# Patient Record
Sex: FEMALE | Race: WHITE | Employment: FULL TIME | URBAN - METROPOLITAN AREA
[De-identification: names, ages, dates, MRNs, and addresses within clinical notes are randomized per-mention and may not be internally consistent; named-entity substitution may affect disease eponyms.]

---

## 2018-08-01 ENCOUNTER — TELEPHONE (OUTPATIENT)
Dept: FAMILY MEDICINE CLINIC | Age: 28
End: 2018-08-01

## 2018-08-02 ENCOUNTER — OFFICE VISIT (OUTPATIENT)
Dept: FAMILY MEDICINE CLINIC | Age: 28
End: 2018-08-02

## 2018-08-02 VITALS
TEMPERATURE: 96.9 F | HEIGHT: 71 IN | WEIGHT: 113 LBS | BODY MASS INDEX: 15.82 KG/M2 | SYSTOLIC BLOOD PRESSURE: 126 MMHG | HEART RATE: 73 BPM | RESPIRATION RATE: 16 BRPM | DIASTOLIC BLOOD PRESSURE: 84 MMHG | OXYGEN SATURATION: 97 %

## 2018-08-02 DIAGNOSIS — Z76.89 ENCOUNTER TO ESTABLISH CARE: ICD-10-CM

## 2018-08-02 DIAGNOSIS — G89.29 CHRONIC BILATERAL LOW BACK PAIN WITHOUT SCIATICA: ICD-10-CM

## 2018-08-02 DIAGNOSIS — M54.50 CHRONIC BILATERAL LOW BACK PAIN WITHOUT SCIATICA: ICD-10-CM

## 2018-08-02 DIAGNOSIS — M25.551 BILATERAL HIP PAIN: Primary | ICD-10-CM

## 2018-08-02 DIAGNOSIS — M25.552 BILATERAL HIP PAIN: Primary | ICD-10-CM

## 2018-08-02 DIAGNOSIS — R39.89 BLADDER PAIN: ICD-10-CM

## 2018-08-02 RX ORDER — IBUPROFEN 800 MG/1
800 TABLET ORAL
Qty: 60 TAB | Refills: 0 | Status: SHIPPED | OUTPATIENT
Start: 2018-08-02

## 2018-08-02 RX ORDER — CYCLOBENZAPRINE HCL 10 MG
5 TABLET ORAL
Qty: 30 TAB | Refills: 0 | Status: SHIPPED | OUTPATIENT
Start: 2018-08-02

## 2018-08-02 NOTE — PATIENT INSTRUCTIONS
Hip Pain: Care Instructions Your Care Instructions Hip pain may be caused by many things, including overuse, a fall, or a twisting movement. Another cause of hip pain is arthritis. Your pain may increase when you stand up, walk, or squat. The pain may come and go or may be constant. Home treatment can help relieve hip pain, swelling, and stiffness. If your pain is ongoing, you may need more tests and treatment. Follow-up care is a key part of your treatment and safety. Be sure to make and go to all appointments, and call your doctor if you are having problems. It's also a good idea to know your test results and keep a list of the medicines you take. How can you care for yourself at home? · Take pain medicines exactly as directed. ¨ If the doctor gave you a prescription medicine for pain, take it as prescribed. ¨ If you are not taking a prescription pain medicine, ask your doctor if you can take an over-the-counter medicine. · Rest and protect your hip. Take a break from any activity, including standing or walking, that may cause pain. · Put ice or a cold pack against your hip for 10 to 20 minutes at a time. Try to do this every 1 to 2 hours for the next 3 days (when you are awake) or until the swelling goes down. Put a thin cloth between the ice and your skin. · Sleep on your healthy side with a pillow between your knees, or sleep on your back with pillows under your knees. · If there is no swelling, you can put moist heat, a heating pad, or a warm cloth on your hip. Do gentle stretching exercises to help keep your hip flexible. · Learn how to prevent falls. Have your vision and hearing checked regularly. Wear slippers or shoes with a nonskid sole. · Stay at a healthy weight. · Wear comfortable shoes. When should you call for help? Call 911 anytime you think you may need emergency care.  For example, call if: 
  · You have sudden chest pain and shortness of breath, or you cough up blood.  
  · You are not able to stand or walk or bear weight.  
  · Your buttocks, legs, or feet feel numb or tingly.  
  · Your leg or foot is cool or pale or changes color.  
  · You have severe pain.  
 Call your doctor now or seek immediate medical care if: 
  · You have signs of infection, such as: 
¨ Increased pain, swelling, warmth, or redness in the hip area. ¨ Red streaks leading from the hip area. ¨ Pus draining from the hip area. ¨ A fever.  
  · You have signs of a blood clot, such as: 
¨ Pain in your calf, back of the knee, thigh, or groin. ¨ Redness and swelling in your leg or groin.  
  · You are not able to bend, straighten, or move your leg normally.  
  · You have trouble urinating or having bowel movements.  
 Watch closely for changes in your health, and be sure to contact your doctor if: 
  · You do not get better as expected. Where can you learn more? Go to http://shelley-arturo.info/. Enter I520 in the search box to learn more about \"Hip Pain: Care Instructions. \" Current as of: November 20, 2017 Content Version: 11.7 © 7708-6036 [employer]. Care instructions adapted under license by [employer] (which disclaims liability or warranty for this information). If you have questions about a medical condition or this instruction, always ask your healthcare professional. [place] [address] any warranty or liability for your use of this information. Low Back Pain: Exercises Your Care Instructions Here are some examples of typical rehabilitation exercises for your condition. Start each exercise slowly. Ease off the exercise if you start to have pain. Your doctor or physical therapist will tell you when you can start these exercises and which ones will work best for you. How to do the exercises Press-up 1. Lie on your stomach, supporting your body with your forearms.  
2. Press your elbows down into the floor to raise your upper back. As you do this, relax your stomach muscles and allow your back to arch without using your back muscles. As your press up, do not let your hips or pelvis come off the floor. 3. Hold for 15 to 30 seconds, then relax. 4. Repeat 2 to 4 times. Alternate arm and leg (bird dog) exercise Do this exercise slowly. Try to keep your body straight at all times, and do not let one hip drop lower than the other. 1. Start on the floor, on your hands and knees. 2. Tighten your belly muscles. 3. Raise one leg off the floor, and hold it straight out behind you. Be careful not to let your hip drop down, because that will twist your trunk. 4. Hold for about 6 seconds, then lower your leg and switch to the other leg. 5. Repeat 8 to 12 times on each leg. 6. Over time, work up to holding for 10 to 30 seconds each time. 7. If you feel stable and secure with your leg raised, try raising the opposite arm straight out in front of you at the same time. Knee-to-chest exercise 1. Lie on your back with your knees bent and your feet flat on the floor. 2. Bring one knee to your chest, keeping the other foot flat on the floor (or keeping the other leg straight, whichever feels better on your lower back). 3. Keep your lower back pressed to the floor. Hold for at least 15 to 30 seconds. 4. Relax, and lower the knee to the starting position. 5. Repeat with the other leg. Repeat 2 to 4 times with each leg. 6. To get more stretch, put your other leg flat on the floor while pulling your knee to your chest. 
Curl-ups 1. Lie on the floor on your back with your knees bent at a 90-degree angle. Your feet should be flat on the floor, about 12 inches from your buttocks. 2. Cross your arms over your chest. If this bothers your neck, try putting your hands behind your neck (not your head), with your elbows spread apart. 3. Slowly tighten your belly muscles and raise your shoulder blades off the floor.  
4. Keep your head in line with your body, and do not press your chin to your chest. 
5. Hold this position for 1 or 2 seconds, then slowly lower yourself back down to the floor. 6. Repeat 8 to 12 times. Pelvic tilt exercise 1. Lie on your back with your knees bent. 2. \"Brace\" your stomach. This means to tighten your muscles by pulling in and imagining your belly button moving toward your spine. You should feel like your back is pressing to the floor and your hips and pelvis are rocking back. 3. Hold for about 6 seconds while you breathe smoothly. 4. Repeat 8 to 12 times. Heel dig bridging 1. Lie on your back with both knees bent and your ankles bent so that only your heels are digging into the floor. Your knees should be bent about 90 degrees. 2. Then push your heels into the floor, squeeze your buttocks, and lift your hips off the floor until your shoulders, hips, and knees are all in a straight line. 3. Hold for about 6 seconds as you continue to breathe normally, and then slowly lower your hips back down to the floor and rest for up to 10 seconds. 4. Do 8 to 12 repetitions. Hamstring stretch in doorway 1. Lie on your back in a doorway, with one leg through the open door. 2. Slide your leg up the wall to straighten your knee. You should feel a gentle stretch down the back of your leg. 3. Hold the stretch for at least 15 to 30 seconds. Do not arch your back, point your toes, or bend either knee. Keep one heel touching the floor and the other heel touching the wall. 4. Repeat with your other leg. 5. Do 2 to 4 times for each leg. Hip flexor stretch 1. Kneel on the floor with one knee bent and one leg behind you. Place your forward knee over your foot. Keep your other knee touching the floor. 2. Slowly push your hips forward until you feel a stretch in the upper thigh of your rear leg. 3. Hold the stretch for at least 15 to 30 seconds. Repeat with your other leg. 4. Do 2 to 4 times on each side.  
Darline Record sit 
 
1. Stand with your back 10 to 12 inches away from a wall. 2. Lean into the wall until your back is flat against it. 3. Slowly slide down until your knees are slightly bent, pressing your lower back into the wall. 4. Hold for about 6 seconds, then slide back up the wall. 5. Repeat 8 to 12 times. Follow-up care is a key part of your treatment and safety. Be sure to make and go to all appointments, and call your doctor if you are having problems. It's also a good idea to know your test results and keep a list of the medicines you take. Where can you learn more? Go to http://shelley-arturo.info/. Enter V958 in the search box to learn more about \"Low Back Pain: Exercises. \" Current as of: November 29, 2017 Content Version: 11.7 © 9059-0912 [employer]. Care instructions adapted under license by [employer] (which disclaims liability or warranty for this information). If you have questions about a medical condition or this instruction, always ask your healthcare professional. [address] any warranty or liability for your use of this information.

## 2018-08-02 NOTE — PROGRESS NOTES
Lilia Current is a 25 y.o. female Chief Complaint Patient presents with [facility] Establish Care  Hip Pain  
  bilateral  
 
 
 
1. Have you been to the ER, urgent care clinic since your last visit? Hospitalized since your last visit? No 
 
2. Have you seen or consulted any other health care providers outside of the [address] [place] [address] since your last visit? Include any pap smears or colon screening.  No

## 2018-08-02 NOTE — PROGRESS NOTES
Judy Barbosa is a 25 y.o.  female and presents with Chief Complaint Patient presents with [facility] Establish Care  Hip Pain  
  bilateral  
 
 
Subjective: 
Ms. Grace Pearson presents today with complaints of bilateral hip pain that has been present for the past 10 years. Pain started in right hip and radiates down into the right leg. She had physical therapy when she was living in [place]. She was told her hips were misaligned. Since she completed physical therapy she had pain in the left hip. She only has pain when she overdoes herself. There is nothing in particular that causes her flare. She will take OTC tylenol-rarely. She has never seen an orthopedic doctor because her symptoms were all muscle related. Now she states she has pain in her bladder. Pain is random. She feels like pain occurs right before her period. She has urine frequency and urgency. She denies burning with urination. She states she had an ultrasound of her bladder and was told she was retaining urine. She was due to see a urologist but was not able to follow up prior to moving from [place]. Additional Concerns: Ms. Grace Pearson is here to establish care. There is no problem list on file for this patient. No Known Allergies History reviewed. No pertinent past medical history. Past Surgical History:  
Procedure Laterality Date  HX GYN Tubal Ligation 2016 Family History Problem Relation Age of Onset  Alcohol abuse Mother  Elevated Lipids Father  Cancer Paternal Grandmother Colon Cancer  Atrial Fibrillation Paternal Grandmother Social History Substance Use Topics  Smoking status: Former Smoker Quit date: 1/1/2015  Smokeless tobacco: Never Used  Alcohol use Yes Comment: occassionally ROS History obtained from the patient General ROS: negative for - chills or fever Respiratory ROS: no cough, shortness of breath, or wheezing Cardiovascular ROS: no chest pain or dyspnea on exertion Gastrointestinal ROS: no abdominal pain, change in bowel habits, or black or bloody stools Genito-Urinary ROS: positive for - urinary frequency/urgency- intermittent Musculoskeletal ROS: positive for - pain in hip - bilateral 
 
All other systems reviewed and are negative. Objective: 
Vitals:  
 08/02/18 1607 08/02/18 1612 BP: 147/87 126/84 Pulse: 73 Resp: 16 Temp: 96.9 °F (36.1 °C) TempSrc: Oral   
SpO2: 97% Weight: 113 lb (51.3 kg) Height: 5' 11\" (1.803 m) PainSc:   6 PainLoc: Back LMP: 08/01/2018 PE General appearance - alert, well appearing, and in no distress Mental status - normal mood, behavior, speech, dress, motor activity, and thought processes Chest - clear to auscultation, no wheezes, rales or rhonchi, symmetric air entry Heart - normal rate and regular rhythm Musculoskeletal - no joint tenderness, deformity or swelling; tenderness along low back Extremities - peripheral pulses normal, no pedal edema, no clubbing or cyanosis Assessment/Plan: 1. Bilateral Hip Pain- does not want additional xrays done today; would like to obtain records from former PCP to review; ibuprofen PRN pain; flexeril for spasms 2. Low Back Pain- Ibuprofen PRN 3. Bladder Pain- UA for further eval 
 
Lab review: orders written for new lab studies as appropriate; see orders Today's Visit: Ibuprofen, Flexeril, UA Health Maintenance:  
 
I have discussed the diagnosis with the patient and the intended plan as seen in the above orders. The patient has received an after-visit summary and questions were answered concerning future plans. I have discussed medication side effects and warnings with the patient as well. I have reviewed the plan of care with the patient, accepted their input and they are in agreement with the treatment goals.   
 
 
Follow-up Disposition: 
Return in about 1 month (around 9/2/2018), or if symptoms worsen or fail to improve, for will follow up after records are obtained from [place]. More than 1/2 of this 30 minute visit was spent in counseling and coordination of care, as described above.  
 
ROB TerrazasC

## 2018-08-02 NOTE — MR AVS SNAPSHOT
[facility] 
 
 
 [id number] [place] [address] [address] [place] [address] [phone] 
[phone] Patient: Marcela Marques MRN: [id number] HTN:[id number] Visit Information Date & Time Provider Department Dept. Phone Encounter #  
 8/2/2018  4:00 PM Hasmukh Traore [phone] Follow-up Instructions Return in about 1 month (around 9/2/2018), or if symptoms worsen or fail to improve, for will follow up after records are obtained from [place]. Upcoming Health Maintenance Date Due DTaP/Tdap/Td series (1 - Tdap) 5/19/2009 PAP AKA CERVICAL CYTOLOGY 5/19/2009 Influenza Age 5 to Adult 8/1/2018 Allergies as of 8/2/2018  Review Complete On: 8/2/2018 By: Dang Alexis NP No Known Allergies Current Immunizations  Never Reviewed No immunizations on file. Not reviewed this visit You Were Diagnosed With   
  
 Codes Comments Bilateral hip pain    -  Primary ICD-10-CM: M25.551, M25.552 ICD-9-CM: 719.45 Chronic bilateral low back pain without sciatica     ICD-10-CM: M54.5, G89.29 ICD-9-CM: 724.2, 338.29 Bladder pain     ICD-10-CM: R39.89 ICD-9-CM: 788.99 Encounter to establish care     ICD-10-CM: Z76.89 
ICD-9-CM: V65.8 Vitals BP Pulse Temp Resp Height(growth percentile) Weight(growth percentile) 126/84 (BP 1 Location: Left arm, BP Patient Position: Sitting) 73 96.9 °F (36.1 °C) (Oral) 16 5' 11\" (1.803 m) 113 lb (51.3 kg) LMP SpO2 BMI OB Status Smoking Status 08/01/2018 (Exact Date) 97% 15.76 kg/m2 Having regular periods Former Smoker Vitals History BMI and BSA Data Body Mass Index Body Surface Area 15.76 kg/m 2 1.6 m 2 Preferred Pharmacy Pharmacy Name Phone [facility] [facility] [phone] Your Updated Medication List  
  
   
 This list is accurate as of 8/2/18  4:32 PM.  Always use your most recent med list.  
  
  
  
  
 cyclobenzaprine 10 mg tablet Commonly known as:  FLEXERIL Take 0.5 Tabs by mouth three (3) times daily as needed for Muscle Spasm(s). ibuprofen 800 mg tablet Commonly known as:  MOTRIN Take 1 Tab by mouth every eight (8) hours as needed for Pain. Prescriptions Sent to Pharmacy Refills  
 ibuprofen (MOTRIN) 800 mg tablet 0 Sig: Take 1 Tab by mouth every eight (8) hours as needed for Pain. Class: Normal  
 Pharmacy: [facility] [address]  #: [phone] Route: Oral  
 cyclobenzaprine (FLEXERIL) 10 mg tablet 0 Sig: Take 0.5 Tabs by mouth three (3) times daily as needed for Muscle Spasm(s). Class: Normal  
 Pharmacy: [facility] [address] [place] [address]. [address] Ph #: [phone] Route: Oral  
  
Follow-up Instructions Return in about 1 month (around 9/2/2018), or if symptoms worsen or fail to improve, for will follow up after records are obtained from [place]. To-Do List   
 08/02/2018 Lab:  URINALYSIS W/ RFLX MICROSCOPIC Patient Instructions Hip Pain: Care Instructions Your Care Instructions Hip pain may be caused by many things, including overuse, a fall, or a twisting movement. Another cause of hip pain is arthritis. Your pain may increase when you stand up, walk, or squat. The pain may come and go or may be constant. Home treatment can help relieve hip pain, swelling, and stiffness. If your pain is ongoing, you may need more tests and treatment. Follow-up care is a key part of your treatment and safety. Be sure to make and go to all appointments, and call your doctor if you are having problems. It's also a good idea to know your test results and keep a list of the medicines you take. How can you care for yourself at home? · Take pain medicines exactly as directed. ¨ If the doctor gave you a prescription medicine for pain, take it as prescribed. ¨ If you are not taking a prescription pain medicine, ask your doctor if you can take an over-the-counter medicine. · Rest and protect your hip. Take a break from any activity, including standing or walking, that may cause pain. · Put ice or a cold pack against your hip for 10 to 20 minutes at a time. Try to do this every 1 to 2 hours for the next 3 days (when you are awake) or until the swelling goes down. Put a thin cloth between the ice and your skin. · Sleep on your healthy side with a pillow between your knees, or sleep on your back with pillows under your knees. · If there is no swelling, you can put moist heat, a heating pad, or a warm cloth on your hip. Do gentle stretching exercises to help keep your hip flexible. · Learn how to prevent falls. Have your vision and hearing checked regularly. Wear slippers or shoes with a nonskid sole. · Stay at a healthy weight. · Wear comfortable shoes. When should you call for help? Call 911 anytime you think you may need emergency care. For example, call if: 
  · You have sudden chest pain and shortness of breath, or you cough up blood.  
  · You are not able to stand or walk or bear weight.  
  · Your buttocks, legs, or feet feel numb or tingly.  
  · Your leg or foot is cool or pale or changes color.  
  · You have severe pain.  
 Call your doctor now or seek immediate medical care if: 
  · You have signs of infection, such as: 
¨ Increased pain, swelling, warmth, or redness in the hip area. ¨ Red streaks leading from the hip area. ¨ Pus draining from the hip area. ¨ A fever.  
  · You have signs of a blood clot, such as: 
¨ Pain in your calf, back of the knee, thigh, or groin. ¨ Redness and swelling in your leg or groin.  
  · You are not able to bend, straighten, or move your leg normally.   · You have trouble urinating or having bowel movements.  
 Watch closely for changes in your health, and be sure to contact your doctor if: 
  · You do not get better as expected. Where can you learn more? Go to http://shelley-arturo.info/. Enter C208 in the search box to learn more about \"Hip Pain: Care Instructions. \" Current as of: November 20, 2017 Content Version: 11.7 © 4087-0851 [employer] Care instructions adapted under license by [employer] (which disclaims liability or warranty for this information). If you have questions about a medical condition or this instruction, always ask your healthcare professional. [address] any warranty or liability for your use of this information. Low Back Pain: Exercises Your Care Instructions Here are some examples of typical rehabilitation exercises for your condition. Start each exercise slowly. Ease off the exercise if you start to have pain. Your doctor or physical therapist will tell you when you can start these exercises and which ones will work best for you. How to do the exercises Press-up 1. Lie on your stomach, supporting your body with your forearms. 2. Press your elbows down into the floor to raise your upper back. As you do this, relax your stomach muscles and allow your back to arch without using your back muscles. As your press up, do not let your hips or pelvis come off the floor. 3. Hold for 15 to 30 seconds, then relax. 4. Repeat 2 to 4 times. Alternate arm and leg (bird dog) exercise Do this exercise slowly. Try to keep your body straight at all times, and do not let one hip drop lower than the other. 1. Start on the floor, on your hands and knees. 2. Tighten your belly muscles. 3. Raise one leg off the floor, and hold it straight out behind you. Be careful not to let your hip drop down, because that will twist your trunk. 4. Hold for about 6 seconds, then lower your leg and switch to the other leg. 5. Repeat 8 to 12 times on each leg. 6. Over time, work up to holding for 10 to 30 seconds each time. 7. If you feel stable and secure with your leg raised, try raising the opposite arm straight out in front of you at the same time. Knee-to-chest exercise 1. Lie on your back with your knees bent and your feet flat on the floor. 2. Bring one knee to your chest, keeping the other foot flat on the floor (or keeping the other leg straight, whichever feels better on your lower back). 3. Keep your lower back pressed to the floor. Hold for at least 15 to 30 seconds. 4. Relax, and lower the knee to the starting position. 5. Repeat with the other leg. Repeat 2 to 4 times with each leg. 6. To get more stretch, put your other leg flat on the floor while pulling your knee to your chest. 
Curl-ups 1. Lie on the floor on your back with your knees bent at a 90-degree angle. Your feet should be flat on the floor, about 12 inches from your buttocks. 2. Cross your arms over your chest. If this bothers your neck, try putting your hands behind your neck (not your head), with your elbows spread apart. 3. Slowly tighten your belly muscles and raise your shoulder blades off the floor. 4. Keep your head in line with your body, and do not press your chin to your chest. 
5. Hold this position for 1 or 2 seconds, then slowly lower yourself back down to the floor. 6. Repeat 8 to 12 times. Pelvic tilt exercise 1. Lie on your back with your knees bent. 2. \"Brace\" your stomach. This means to tighten your muscles by pulling in and imagining your belly button moving toward your spine. You should feel like your back is pressing to the floor and your hips and pelvis are rocking back. 3. Hold for about 6 seconds while you breathe smoothly. 4. Repeat 8 to 12 times. Heel dig bridging 1. Lie on your back with both knees bent and your ankles bent so that only your heels are digging into the floor. Your knees should be bent about 90 degrees. 2. Then push your heels into the floor, squeeze your buttocks, and lift your hips off the floor until your shoulders, hips, and knees are all in a straight line. 3. Hold for about 6 seconds as you continue to breathe normally, and then slowly lower your hips back down to the floor and rest for up to 10 seconds. 4. Do 8 to 12 repetitions. Hamstring stretch in doorway 1. Lie on your back in a doorway, with one leg through the open door. 2. Slide your leg up the wall to straighten your knee. You should feel a gentle stretch down the back of your leg. 3. Hold the stretch for at least 15 to 30 seconds. Do not arch your back, point your toes, or bend either knee. Keep one heel touching the floor and the other heel touching the wall. 4. Repeat with your other leg. 5. Do 2 to 4 times for each leg. Hip flexor stretch 1. Kneel on the floor with one knee bent and one leg behind you. Place your forward knee over your foot. Keep your other knee touching the floor. 2. Slowly push your hips forward until you feel a stretch in the upper thigh of your rear leg. 3. Hold the stretch for at least 15 to 30 seconds. Repeat with your other leg. 4. Do 2 to 4 times on each side. Wall sit 1. Stand with your back 10 to 12 inches away from a wall. 2. Lean into the wall until your back is flat against it. 3. Slowly slide down until your knees are slightly bent, pressing your lower back into the wall. 4. Hold for about 6 seconds, then slide back up the wall. 5. Repeat 8 to 12 times. Follow-up care is a key part of your treatment and safety. Be sure to make and go to all appointments, and call your doctor if you are having problems. It's also a good idea to know your test results and keep a list of the medicines you take. Where can you learn more? Go to http://shelley-arturo.info/. Enter Q521 in the search box to learn more about \"Low Back Pain: Exercises. \" Current as of: November 29, 2017 Content Version: 11.7 © 0856-4036 [employer]. Care instructions adapted under license by [employer] (which disclaims liability or warranty for this information). If you have questions about a medical condition or this instruction, always ask your healthcare professional. [address] any warranty or liability for your use of this information. Introducing [facility]! Brittany Dumont introduces Impulsonic patient portal. Now you can access parts of your medical record, email your doctor's office, and request medication refills online. 1. In your internet browser, go to [email or web address] 2. Click on the First Time User? Click Here link in the Sign In box. You will see the New Member Sign Up page. 3. Enter your Impulsonic Access Code exactly as it appears below. You will not need to use this code after youve completed the sign-up process. If you do not sign up before the expiration date, you must request a new code. · Impulsonic Access Code: [email or web address] Expires: 10/31/2018  3:53 PM 
 
4. Enter the last four digits of your Social Security Number (xxxx) and Date of Birth (mm/dd/yyyy) as indicated and click Submit. You will be taken to the next sign-up page. 5. Create a Impulsonic ID. This will be your Impulsonic login ID and cannot be changed, so think of one that is secure and easy to remember. 6. Create a Impulsonic password. You can change your password at any time. 7. Enter your Password Reset Question and Answer. This can be used at a later time if you forget your password. 8. Enter your e-mail address. You will receive e-mail notification when new information is available in [address] 9. Click Sign Up. You can now view and download portions of your medical record. 10. Click the Download Summary menu link to download a portable copy of your medical information. If you have questions, please visit the Frequently Asked Questions section of the [employer] website. Remember, [employer] is NOT to be used for urgent needs. For medical emergencies, dial 911. Now available from your iPhone and Android! Please provide this summary of care documentation to your next provider. Your primary care clinician is listed as Marcia Soto. If you have any questions after today's visit, please call [phone].

## 2018-08-09 ENCOUNTER — HOSPITAL ENCOUNTER (OUTPATIENT)
Dept: LAB | Age: 28
Discharge: HOME | End: 2018-08-09
Payer: COMMERCIAL

## 2018-08-09 DIAGNOSIS — R39.89 BLADDER PAIN: ICD-10-CM

## 2018-08-09 LAB
APPEARANCE UR: CLEAR
BACTERIA URNS QL MICRO: NEGATIVE /HPF
BILIRUB UR QL: NEGATIVE
COLOR UR: YELLOW
EPITH CASTS URNS QL MICRO: NORMAL /LPF (ref 0–5)
GLUCOSE UR STRIP.AUTO-MCNC: NEGATIVE MG/DL
HGB UR QL STRIP: NEGATIVE
KETONES UR QL STRIP.AUTO: NEGATIVE MG/DL
LEUKOCYTE ESTERASE UR QL STRIP.AUTO: ABNORMAL
NITRITE UR QL STRIP.AUTO: NEGATIVE
PH UR STRIP: 6 [PH] (ref 5–8)
PROT UR STRIP-MCNC: NEGATIVE MG/DL
RBC #/AREA URNS HPF: 0 /HPF (ref 0–5)
SP GR UR REFRACTOMETRY: 1.01 (ref 1–1.03)
UROBILINOGEN UR QL STRIP.AUTO: 0.2 EU/DL (ref 0.2–1)
WBC URNS QL MICRO: NORMAL /HPF (ref 0–4)

## 2018-08-09 PROCEDURE — 81001 URINALYSIS AUTO W/SCOPE: CPT | Performed by: NURSE PRACTITIONER

## 2018-08-10 NOTE — PROGRESS NOTES
Patient said thank God but she is still having the same issue with her bladder and she will see you in a month

## 2018-09-06 ENCOUNTER — OFFICE VISIT (OUTPATIENT)
Dept: FAMILY MEDICINE CLINIC | Age: 28
End: 2018-09-06

## 2018-09-06 VITALS
OXYGEN SATURATION: 100 % | BODY MASS INDEX: 15.88 KG/M2 | RESPIRATION RATE: 16 BRPM | SYSTOLIC BLOOD PRESSURE: 111 MMHG | WEIGHT: 113.4 LBS | DIASTOLIC BLOOD PRESSURE: 83 MMHG | HEIGHT: 71 IN | TEMPERATURE: 96.5 F | HEART RATE: 96 BPM

## 2018-09-06 DIAGNOSIS — F41.9 ANXIETY: ICD-10-CM

## 2018-09-06 DIAGNOSIS — Z00.8 ENCOUNTER FOR BIOMETRIC SCREENING: ICD-10-CM

## 2018-09-06 DIAGNOSIS — R10.2 PELVIC PAIN: ICD-10-CM

## 2018-09-06 DIAGNOSIS — M25.552 BILATERAL HIP PAIN: Primary | ICD-10-CM

## 2018-09-06 DIAGNOSIS — R63.6 LOW WEIGHT: ICD-10-CM

## 2018-09-06 DIAGNOSIS — R39.89 BLADDER PAIN: ICD-10-CM

## 2018-09-06 DIAGNOSIS — M25.551 BILATERAL HIP PAIN: Primary | ICD-10-CM

## 2018-09-06 RX ORDER — HYDROXYZINE 25 MG/1
25 TABLET, FILM COATED ORAL
Qty: 30 TAB | Refills: 0 | Status: SHIPPED | OUTPATIENT
Start: 2018-09-06 | End: 2018-09-16

## 2018-09-06 NOTE — PROGRESS NOTES
Benjy Hunt is a 25 y.o. female  Chief Complaint   Patient presents with    Medication Evaluation     1. Have you been to the ER, urgent care clinic since your last visit? Hospitalized since your last visit? No    2. Have you seen or consulted any other health care providers outside of the [address] [place] [address] since your last visit? Include any pap smears or colon screening.  No

## 2018-09-06 NOTE — PROGRESS NOTES
Margie Escobar is a 25 y.o.  female and presents with    Chief Complaint   Patient presents with    Medication Evaluation       Subjective:  Ms. Erendira Masterson presents today for follow up of bilateral hip pain. She was given flexeril but reports dizziness. She does report improvement in pain with taking the flexeril. When she was in [facility] [place] she was told that xrays of hips were normal and pain was more musculoskeletal and would need PT. She attended several sessions but did not complete it. She reports low back pain with associated bladder pain. She states pain did not start until she had PT back in [place]. She continues to have urine frequency and urgency. She denies incontinence. Symptoms are absent when she is working out and when she is on her menses. She would like labs done for her wellness/biometric screening. Received records from [place] but no imaging was sent. She reports anxiety. She was diagnosed with panic disorder and anxiety. She does not feel depressed. She does obsess over certain things. Depression improved when she moved away from her family. SSRI's caused her to have suicidal thoughts. She was on xanax in the past but states it caused her to be sleepy. Additional Concerns: No         There is no problem list on file for this patient. Current Outpatient Prescriptions   Medication Sig Dispense Refill    cyclobenzaprine (FLEXERIL) 10 mg tablet Take 0.5 Tabs by mouth three (3) times daily as needed for Muscle Spasm(s). 30 Tab 0    ibuprofen (MOTRIN) 800 mg tablet Take 1 Tab by mouth every eight (8) hours as needed for Pain. 60 Tab 0     No Known Allergies  History reviewed. No pertinent past medical history.   Past Surgical History:   Procedure Laterality Date    HX GYN      Tubal Ligation 2016     Family History   Problem Relation Age of Onset    Alcohol abuse Mother     Elevated Lipids Father     Cancer Paternal Grandmother      Colon Cancer    Atrial Fibrillation Paternal Grandmother      Social History   Substance Use Topics    Smoking status: Former Smoker     Quit date: 1/1/2015    Smokeless tobacco: Never Used    Alcohol use Yes      Comment: occassionally        ROS   History obtained from the patient  General ROS: negative for - chills or fever  Genito-Urinary ROS: positive for - bladder pain  Musculoskeletal ROS: positive for - pain in hip - bilateral    All other systems reviewed and are negative. Objective:  Vitals:    09/06/18 1554   BP: 111/83   Pulse: 96   Resp: 16   Temp: 96.5 °F (35.8 °C)   TempSrc: Oral   SpO2: 100%   Weight: 113 lb 6.4 oz (51.4 kg)   Height: 5' 11\" (1.803 m)   PainSc:   2   PainLoc: Hip   LMP: 08/02/2018       PE  General appearance - alert, well appearing, and in no distress  Mental status - affect appropriate to mood      LABS   8/9/2018  7:50 PM - [facility]   Component Results   Component Value Flag Ref Range Units Status   WBC 8 to 10  0 - 4 /hpf Final   RBC 0  0 - 5 /hpf Final   Epithelial cells FEW  0 - 5 /lpf Final   Bacteria NEGATIVE   NEG /hpf Final       Assessment/Plan:    1. Bilateral Hip Pain- some improvement with flexeril but med makes her dizzy; declines to have additional imaging at this time; does not want to continue with PT    2. Bladder Pain/ Pelvic Pain- associated with low back pain, no urine incontinence     3. Anxiety- trial of Hydroxyzine PRN    4. Low Weight- labs for further evaluation, in the mean time trial of Ensure and/or Boost    Lab review: no lab studies available for review at time of visit    Today's Visit: Lipid Panel, BMP, TSH, CBC    Health Maintenance:     I have discussed the diagnosis with the patient and the intended plan as seen in the above orders. The patient has received an after-visit summary and questions were answered concerning future plans. I have discussed medication side effects and warnings with the patient as well.  I have reviewed the plan of care with the patient, accepted their input and they are in agreement with the treatment goals. Follow-up Disposition:  Return if symptoms worsen or fail to improve. More than 1/2 of this 15 minute visit was spent in counseling and coordination of care, as described above.     CHADD Owen

## 2018-09-06 NOTE — MR AVS SNAPSHOT
[facility] 
 
 
 [address] [facility] [address] [id number] [phone] Patient: Marcela Marques MRN: [id number] [facility]:[birth year] Visit Information Date & Time Provider Department Dept. Phone Encounter #  
 9/6/2018  4:00 [place] [id number] [address] [phone] [id number] Follow-up Instructions Return if symptoms worsen or fail to improve. Follow-up and Disposition History Upcoming Health Maintenance Date Due DTaP/Tdap/Td series (1 - Tdap) 5/19/2009 PAP AKA CERVICAL CYTOLOGY 5/19/2009 Influenza Age 5 to Adult 8/1/2018 Allergies as of 9/6/2018  Review Complete On: 9/6/2018 By: Dang Alexis NP No Known Allergies Current Immunizations  Never Reviewed No immunizations on file. Not reviewed this visit You Were Diagnosed With   
  
 Codes Comments Bilateral hip pain    -  Primary ICD-10-CM: M25.551, M25.552 ICD-9-CM: 719.45 Bladder pain     ICD-10-CM: R39.89 ICD-9-CM: 788.99 Pelvic pain     ICD-10-CM: R10.2 ICD-9-CM: VAC9980 Low weight     ICD-10-CM: R63.6 ICD-9-CM: 783.22 Anxiety     ICD-10-CM: F41.9 ICD-9-CM: 300.00 Encounter for biometric screening     ICD-10-CM: Z01.89 ICD-9-CM: V72.85 Vitals BP Pulse Temp Resp Height(growth percentile) Weight(growth percentile) 111/83 (BP 1 Location: Right arm, BP Patient Position: Sitting) 96 96.5 °F (35.8 °C) (Oral) 16 5' 11\" (1.803 m) 113 lb 6.4 oz (51.4 kg) LMP SpO2 BMI OB Status Smoking Status 08/02/2018 100% 15.82 kg/m2 Having regular periods Former Smoker Vitals History BMI and BSA Data Body Mass Index Body Surface Area  
 15.82 kg/m 2 1.6 m 2 Preferred Pharmacy Pharmacy Name Phone [facility] [address] [place] [facility] [phone] Your Updated Medication List  
  
   
 This list is accurate as of 9/6/18  4:32 PM.  Always use your most recent med list.  
  
  
  
  
 cyclobenzaprine 10 mg tablet Commonly known as:  FLEXERIL Take 0.5 Tabs by mouth three (3) times daily as needed for Muscle Spasm(s). hydrOXYzine HCl 25 mg tablet Commonly known as:  ATARAX Take 1 Tab by mouth three (3) times daily as needed for Anxiety for up to 10 days. ibuprofen 800 mg tablet Commonly known as:  MOTRIN Take 1 Tab by mouth every eight (8) hours as needed for Pain. Prescriptions Sent to Pharmacy Refills  
 hydrOXYzine HCl (ATARAX) 25 mg tablet 0 Sig: Take 1 Tab by mouth three (3) times daily as needed for Anxiety for up to 10 days. Class: Normal  
 Pharmacy: [facility] [address] [place] [address] [address]  #: [phone] Route: Oral  
  
We Performed the Following REFERRAL TO PHYSICAL THERAPY [JBV79 Custom] Comments:  
 Please evaluate 26 y/o female for possible pelvic floor therapy Follow-up Instructions Return if symptoms worsen or fail to improve. To-Do List   
 09/07/2018 Lab:  CBC W/O DIFF   
  
 09/07/2018 Lab:  LIPID PANEL   
  
 09/07/2018 Lab:  METABOLIC PANEL, BASIC   
  
 09/07/2018 Lab:  TSH 3RD GENERATION Referral Information Referral ID Referred By Referred To  
  
 [id number] PT DEVENDRA    
   [address] Phone: [phone] Fax: [phone] Visits Status Start Date End Date 1 New Request 9/6/18 9/6/19 If your referral has a status of pending review or denied, additional information will be sent to support the outcome of this decision. Patient Instructions Pelvic Pain: Care Instructions Your Care Instructions Pelvic pain, or pain in the lower belly, can have many causes.  Often pelvic pain is not serious and gets better in a few days. If your pain continues or gets worse, you may need tests and treatment. Tell your doctor about any new symptoms. These may be signs of a serious problem. Follow-up care is a key part of your treatment and safety. Be sure to make and go to all appointments, and call your doctor if you are having problems. It's also a good idea to know your test results and keep a list of the medicines you take. How can you care for yourself at home? · Rest until you feel better. Lie down, and raise your legs by placing a pillow under your knees. · Drink plenty of fluids. You may find that small, frequent sips are easier on your stomach than if you drink a lot at once. Avoid drinks with carbonation or caffeine, such as soda pop, tea, or coffee. · Try eating several small meals instead of 2 or 3 large ones. Eat mild foods, such as rice, dry toast or crackers, bananas, and applesauce. Avoid fatty and spicy foods, other fruits, and alcohol until 48 hours after your symptoms have gone away. · Take an over-the-counter pain medicine, such as acetaminophen (Tylenol), ibuprofen (Advil, Motrin), or naproxen (Aleve). Read and follow all instructions on the label. · Do not take two or more pain medicines at the same time unless the doctor told you to. Many pain medicines have acetaminophen, which is Tylenol. Too much acetaminophen (Tylenol) can be harmful. · You can put a heating pad, a warm cloth, or moist heat on your belly to relieve pain. When should you call for help? Call your doctor now or seek immediate medical care if: 
  · You have a new or higher fever.  
  · You have unusual vaginal bleeding.  
  · You have new or worse belly or pelvic pain.  
  · You have vaginal discharge that has increased in amount or smells bad.  
 Watch closely for changes in your health, and be sure to contact your doctor if: 
  · You do not get better as expected. Where can you learn more? Go to http://shelley-arturo.info/. Enter 436-357-564 in the search box to learn more about \"Pelvic Pain: Care Instructions. \" Current as of: October 6, 2017 Content Version: 11.7 © 6793-8569 [employer]. Care instructions adapted under license by [employer] (which disclaims liability or warranty for this information). If you have questions about a medical condition or this instruction, always ask your healthcare professional. [address] any warranty or liability for your use of this information. Introducing [facility]! Pat Navarro introduces [employer] patient portal. Now you can access parts of your medical record, email your doctor's office, and request medication refills online. 1. In your internet browser, go to [email or web address] [email or web address]. [email or web address]/[employer] 2. Click on the First Time User? Click Here link in the Sign In box. You will see the New Member Sign Up page. 3. Enter your [employer] Access Code exactly as it appears below. You will not need to use this code after youve completed the sign-up process. If you do not sign up before the expiration date, you must request a new code. · [employer] Access Code: [email or web address] Expires: 10/31/2018  3:53 PM 
 
4. Enter the last four digits of your Social Security Number (xxxx) and Date of Birth (mm/dd/yyyy) as indicated and click Submit. You will be taken to the next sign-up page. 5. Create a [employer] ID. This will be your [employer] login ID and cannot be changed, so think of one that is secure and easy to remember. 6. Create a [employer] password. You can change your password at any time. 7. Enter your Password Reset Question and Answer. This can be used at a later time if you forget your password. 8. Enter your e-mail address. You will receive e-mail notification when new information is available in [address] 9. Click Sign Up. You can now view and download portions of your medical record. 10. Click the Download Summary menu link to download a portable copy of your medical information. If you have questions, please visit the Frequently Asked Questions section of the [employer] website. Remember, [employer] is NOT to be used for urgent needs. For medical emergencies, dial 911. Now available from your iPhone and Android! Please provide this summary of care documentation to your next provider. Your primary care clinician is listed as Ramos Tao. If you have any questions after today's visit, please call [phone].

## 2018-09-06 NOTE — PATIENT INSTRUCTIONS
Pelvic Pain: Care Instructions  Your Care Instructions    Pelvic pain, or pain in the lower belly, can have many causes. Often pelvic pain is not serious and gets better in a few days. If your pain continues or gets worse, you may need tests and treatment. Tell your doctor about any new symptoms. These may be signs of a serious problem. Follow-up care is a key part of your treatment and safety. Be sure to make and go to all appointments, and call your doctor if you are having problems. It's also a good idea to know your test results and keep a list of the medicines you take. How can you care for yourself at home? · Rest until you feel better. Lie down, and raise your legs by placing a pillow under your knees. · Drink plenty of fluids. You may find that small, frequent sips are easier on your stomach than if you drink a lot at once. Avoid drinks with carbonation or caffeine, such as soda pop, tea, or coffee. · Try eating several small meals instead of 2 or 3 large ones. Eat mild foods, such as rice, dry toast or crackers, bananas, and applesauce. Avoid fatty and spicy foods, other fruits, and alcohol until 48 hours after your symptoms have gone away. · Take an over-the-counter pain medicine, such as acetaminophen (Tylenol), ibuprofen (Advil, Motrin), or naproxen (Aleve). Read and follow all instructions on the label. · Do not take two or more pain medicines at the same time unless the doctor told you to. Many pain medicines have acetaminophen, which is Tylenol. Too much acetaminophen (Tylenol) can be harmful. · You can put a heating pad, a warm cloth, or moist heat on your belly to relieve pain. When should you call for help?   Call your doctor now or seek immediate medical care if:    · You have a new or higher fever.     · You have unusual vaginal bleeding.     · You have new or worse belly or pelvic pain.     · You have vaginal discharge that has increased in amount or smells bad.    Watch closely for changes in your health, and be sure to contact your doctor if:    · You do not get better as expected. Where can you learn more? Go to http://shelley-arturo.info/. Enter [phone] in the search box to learn more about \"Pelvic Pain: Care Instructions. \"  Current as of: October 6, 2017  Content Version: 11.7  © 8649-1341 [employer]ER[employer] assistant. Care instructions adapted under license by [employer] (which disclaims liability or warranty for this information). If you have questions about a medical condition or this instruction, always ask your healthcare professional. [place] [address] any warranty or liability for your use of this information.

## 2018-09-08 ENCOUNTER — HOSPITAL ENCOUNTER (OUTPATIENT)
Dept: LAB | Age: 28
Discharge: HOME | End: 2018-09-08
Payer: COMMERCIAL

## 2018-09-08 LAB
ANION GAP SERPL CALC-SCNC: 5 MMOL/L (ref 3–18)
BUN SERPL-MCNC: 18 MG/DL (ref 7–18)
BUN/CREAT SERPL: 23 (ref 12–20)
CALCIUM SERPL-MCNC: 9 MG/DL (ref 8.5–10.1)
CHLORIDE SERPL-SCNC: 106 MMOL/L (ref 100–108)
CHOLEST SERPL-MCNC: 171 MG/DL
CO2 SERPL-SCNC: 30 MMOL/L (ref 21–32)
CREAT SERPL-MCNC: 0.77 MG/DL (ref 0.6–1.3)
ERYTHROCYTE [DISTWIDTH] IN BLOOD BY AUTOMATED COUNT: 12.5 % (ref 11.6–14.5)
GLUCOSE SERPL-MCNC: 85 MG/DL (ref 74–99)
HCT VFR BLD AUTO: 39.5 % (ref 35–45)
HDLC SERPL-MCNC: 55 MG/DL (ref 40–60)
HDLC SERPL: 3.1 {RATIO} (ref 0–5)
HGB BLD-MCNC: 13.4 G/DL (ref 12–16)
LDLC SERPL CALC-MCNC: 98.2 MG/DL (ref 0–100)
LIPID PROFILE,FLP: NORMAL
MCH RBC QN AUTO: 29.3 PG (ref 24–34)
MCHC RBC AUTO-ENTMCNC: 33.9 G/DL (ref 31–37)
MCV RBC AUTO: 86.4 FL (ref 74–97)
PLATELET # BLD AUTO: 235 K/UL (ref 135–420)
PMV BLD AUTO: 9.4 FL (ref 9.2–11.8)
POTASSIUM SERPL-SCNC: 4.4 MMOL/L (ref 3.5–5.5)
RBC # BLD AUTO: 4.57 M/UL (ref 4.2–5.3)
SODIUM SERPL-SCNC: 141 MMOL/L (ref 136–145)
TRIGL SERPL-MCNC: 89 MG/DL (ref ?–150)
TSH SERPL DL<=0.05 MIU/L-ACNC: 1.4 UIU/ML (ref 0.36–3.74)
VLDLC SERPL CALC-MCNC: 17.8 MG/DL
WBC # BLD AUTO: 4.7 K/UL (ref 4.6–13.2)

## 2018-09-08 PROCEDURE — 85027 COMPLETE CBC AUTOMATED: CPT | Performed by: NURSE PRACTITIONER

## 2018-09-08 PROCEDURE — 84443 ASSAY THYROID STIM HORMONE: CPT | Performed by: NURSE PRACTITIONER

## 2018-09-08 PROCEDURE — 80061 LIPID PANEL: CPT | Performed by: NURSE PRACTITIONER

## 2018-09-08 PROCEDURE — 36415 COLL VENOUS BLD VENIPUNCTURE: CPT | Performed by: NURSE PRACTITIONER

## 2018-09-08 PROCEDURE — 80048 BASIC METABOLIC PNL TOTAL CA: CPT | Performed by: NURSE PRACTITIONER

## 2018-09-20 ENCOUNTER — TELEPHONE (OUTPATIENT)
Dept: FAMILY MEDICINE CLINIC | Age: 28
End: 2018-09-20

## 2018-09-21 NOTE — TELEPHONE ENCOUNTER
Call placed to patient left voicemail advising labs are normal and screening papers faxed already
Have Your Spot(S) Been Treated In The Past?: has been treated
Patient is asking about if her paperwork was already faxed. Patient declined to give detailed information when asked the type of paperwork she was asking about, stating that the nurse should know. Patient also requesting a call back to discuss her recent lab result.
Hpi Title: Evaluation of Skin Lesions

## 2018-11-27 ENCOUNTER — TELEPHONE (OUTPATIENT)
Dept: FAMILY MEDICINE CLINIC | Age: 28
End: 2018-11-27

## 2018-11-27 NOTE — TELEPHONE ENCOUNTER
Pt. Stated that she received almost $500 bill for lab work, due to provider not putting it in as a preventive code, but instead a diagnostic code. Would like to be called back regarding this matter.

## 2018-11-30 NOTE — TELEPHONE ENCOUNTER
Returned patient call but there was no answer. Left a message to give the office a call back and to ask for me.

## 2018-12-27 ENCOUNTER — TELEPHONE (OUTPATIENT)
Dept: FAMILY MEDICINE CLINIC | Age: 28
End: 2018-12-27

## 2018-12-27 NOTE — TELEPHONE ENCOUNTER
Left voicemail to the patient to give the office a call back to look for me. If I am not available, please inform the patient that her lab bill issue is currently in process, that I already contacted [facility] lab to see if they could hold her balance until CYRUS Alatorre comes back from maternity leave.

## 2019-02-20 ENCOUNTER — TELEPHONE (OUTPATIENT)
Dept: FAMILY MEDICINE CLINIC | Age: 29
End: 2019-02-20

## 2019-02-20 NOTE — TELEPHONE ENCOUNTER
LVM to the patient to inform her that her billing concern is currently in process that I would also need to clarify with her DOS as dates may be incorrect. Patient was complaining about lab bills and now complaining about her copays.

## 2019-02-25 NOTE — TELEPHONE ENCOUNTER
Patient called today saying that the two bills were now sent to collections and it is affecting her credit. She is very upset and needs it resolved as soon as possible. Please advise, thank you.

## 2019-02-25 NOTE — TELEPHONE ENCOUNTER
LVM to the patient to clarify her concern regarding her bill whether it was her office visits or lab bill. Please relay the message to the patient and send a detailed information back to me.

## 2019-02-26 NOTE — TELEPHONE ENCOUNTER
Pt. Stated she is fed up regarding this matter and she needs all of her claims including labs to be resubmitted as preventative not diagnostic. Awaiting to be called back.  Please assist.

## 2019-02-27 NOTE — TELEPHONE ENCOUNTER
Patient called in and stated that when she came in for her visit she had to get a physical that was required by her insurance [employer] and the lab work that was required was supposed to be listed as preventive and not diagnostic so she wasn't supposed to be charged at all. She also stated that we were supposed to be resubmitting the lab work as preventive so it could be fixed to CYRUS Kerr and I asked her if she remembered what day the visit was and she stated she did not but she has been dealing with this for awhile and she stated that she keeps playing phone tag with Cristine and would like  A phone call back within the hour as she is on her lunch break currently, I let her know I would put  message in for her and let hector know and asked if there was another time just in case and she stated she works until 5:30. Please advise.

## 2019-03-05 NOTE — TELEPHONE ENCOUNTER
Pt. Stated she does not know the date of service. She does not have the bill in front of her. She stated Maira Jimenez can call hospital for date of service because that was there only time she was there. She stated it was at least 3 days after she came to the office for her physical. She would like to be called back.

## 2019-03-13 NOTE — TELEPHONE ENCOUNTER
LVM to the patient to give me a call back-Please inform the patient that DOS 9/6/18 can be counted as CPE but will still end up paying a copay as she was also evaluated for hip pain. This means that hip pain is not included on CPE that was why she was charged for copay. Regarding the lab bill its still currently in process with CYRUS Kerr and will give her a call once I get my answer.

## 2019-03-18 NOTE — TELEPHONE ENCOUNTER
Patient called and complaining stating that her office visit on September 2018. Patient explained that it should have been physical. I informed the patient that she was not seen for CPE and even if its counted as physical, she will still end up paying her copay since she was evaluated for hip pain. Patient then stated that it should have been a physical, because she brought in a paperwork for her wellness. I informed the patient that it is a work physical that should have been paid out front because it is not a regular physical. Patient then stated that she was told by her insurance that her provider just needs to change it to a physical because it is an actual Complete Physical. Patient was very upset and stated that she will report the office. I apologized to the patient for the inconvenience but will double check again with the provider and the insurance.

## 2019-03-18 NOTE — TELEPHONE ENCOUNTER
I spoke with the provider, patient is actually have biometric screening   LVM to patient to discuss information that per NP Kerr that she was only seen for biometric screening that we will not be able to change it. Patient was seen or evaluated for CPE.

## 2019-03-19 NOTE — TELEPHONE ENCOUNTER
Patient called again stating she is getting a  if this is not resolved. She stated the diagnostic code needs to be changed to preventative care. Requesting a phone call back. Please advise, thank you.

## 2019-03-28 ENCOUNTER — TELEPHONE (OUTPATIENT)
Dept: FAMILY MEDICINE CLINIC | Age: 29
End: 2019-03-28

## 2019-03-28 NOTE — TELEPHONE ENCOUNTER
MANDYM to the patient and informed her again that we will not be able to make any changes for her lab and office visit. I also informed the patient to give us a call for further details. When patient calls back please inform the patient that per CYRUS Kerr she actually had biometric screening. Also please inform the patient that we will not be able to make any changes as she was not evaluated or given CPE on the DOS 9/6/18.

## 2019-03-28 NOTE — TELEPHONE ENCOUNTER
Pt. Called in regarding bill and I advised her of what was documented on previous. Pt. Stated she will get her  involved if the office does not want to fix this issue, because she stated she told the provider that she was coming in for preventive care and she stated \"I gave ya'll your $25, I willnot pay for the labs\" She also stated that \"we\" should have sent the claim in as preventative not diagnostic for labs. She has the documentation to prove it and she also gave the documentation to the provider. Please advise.